# Patient Record
Sex: MALE
[De-identification: names, ages, dates, MRNs, and addresses within clinical notes are randomized per-mention and may not be internally consistent; named-entity substitution may affect disease eponyms.]

---

## 2021-12-11 ENCOUNTER — TRANSCRIPTION ENCOUNTER (OUTPATIENT)
Age: 51
End: 2021-12-11

## 2021-12-14 ENCOUNTER — APPOINTMENT (OUTPATIENT)
Dept: ORTHOPEDIC SURGERY | Facility: CLINIC | Age: 51
End: 2021-12-14
Payer: COMMERCIAL

## 2021-12-14 ENCOUNTER — NON-APPOINTMENT (OUTPATIENT)
Age: 51
End: 2021-12-14

## 2021-12-14 DIAGNOSIS — M70.22 OLECRANON BURSITIS, LEFT ELBOW: ICD-10-CM

## 2021-12-14 PROBLEM — Z00.00 ENCOUNTER FOR PREVENTIVE HEALTH EXAMINATION: Status: ACTIVE | Noted: 2021-12-14

## 2021-12-14 PROCEDURE — 99204 OFFICE O/P NEW MOD 45 MIN: CPT | Mod: 25

## 2021-12-14 PROCEDURE — 20605 DRAIN/INJ JOINT/BURSA W/O US: CPT | Mod: LT

## 2021-12-14 NOTE — PHYSICAL EXAM
[de-identified] : Left elbow: \par Skin intact, moderate swelling with mild erythema localized to the posterior aspect of the elbow consistent with olecranon bursitis. \par no sign of infection. \par ROM of the elbow intact. nontender medial and lateral epicondyles, nontender radial head, nontender olecranon. \par no instability. \par sensation intact distally, cap refill < 2 sec  [de-identified] : no xray imaging.

## 2021-12-14 NOTE — ASSESSMENT
[FreeTextEntry1] : 51 year old male with left elbow pain and swelling for 2 months, exam findings consistent with olecranon bursitis. \par The patient underwent successful aspiration of the bursitis today, tolerating the procedure well. He will avoid direct pressure on the elbow, and continue compression for the next 24-48 hours. He will fu as needed should symptoms recur.

## 2021-12-14 NOTE — HISTORY OF PRESENT ILLNESS
[FreeTextEntry1] : 51 year male presents for evaluation of left elbow pain and swelling for about two months. He reports he may have hit his elbow into something a couple months ago, and also notes he had bug bites along the posterior aspect of the elbow two months ago that he thought became infected. since then he has had persistent swelling localized to the posterior aspect of the elbow. He reports associated discomfort with ROM, activity, and direct pressure. He was seen by his PCP recently who placed him on a course of prednisone. He denies any improvement of his symptoms following the course of medication. He presents for evaluation and options for further treatment.

## 2021-12-14 NOTE — PROCEDURE
[FreeTextEntry1] : Left elbow olecranon bursitis aspiration. \par Skin prepped with chlohexadine, and anesthesized with .5 cc of 1% lidocaine. 25 cc of serosangenous fluid aspirated from the olecranon bursa successfully. Band-Aid and compression wrap applied.

## 2022-01-18 ENCOUNTER — APPOINTMENT (OUTPATIENT)
Dept: ORTHOPEDIC SURGERY | Facility: CLINIC | Age: 52
End: 2022-01-18
Payer: COMMERCIAL

## 2022-01-18 DIAGNOSIS — M19.049 PRIMARY OSTEOARTHRITIS, UNSPECIFIED HAND: ICD-10-CM

## 2022-01-18 PROCEDURE — 99213 OFFICE O/P EST LOW 20 MIN: CPT

## 2022-01-18 PROCEDURE — 73130 X-RAY EXAM OF HAND: CPT | Mod: LT

## 2022-01-18 RX ORDER — METHYLPREDNISOLONE 4 MG/1
4 TABLET ORAL
Qty: 21 | Refills: 0 | Status: ACTIVE | COMMUNITY
Start: 2021-10-01

## 2022-01-18 RX ORDER — CEFADROXIL 500 MG/1
500 CAPSULE ORAL
Qty: 20 | Refills: 0 | Status: ACTIVE | COMMUNITY
Start: 2021-10-01

## 2022-01-18 RX ORDER — LOSARTAN POTASSIUM AND HYDROCHLOROTHIAZIDE 25; 100 MG/1; MG/1
100-25 TABLET ORAL
Qty: 30 | Refills: 0 | Status: ACTIVE | COMMUNITY
Start: 2021-09-16

## 2022-01-18 RX ORDER — PREDNISONE 20 MG/1
20 TABLET ORAL
Qty: 11 | Refills: 0 | Status: ACTIVE | COMMUNITY
Start: 2021-11-05

## 2022-01-18 RX ORDER — CEFUROXIME AXETIL 250 MG/1
250 TABLET ORAL
Qty: 20 | Refills: 0 | Status: ACTIVE | COMMUNITY
Start: 2021-11-20

## 2022-01-18 RX ORDER — MELOXICAM 15 MG/1
15 TABLET ORAL
Qty: 14 | Refills: 1 | Status: ACTIVE | COMMUNITY
Start: 2022-01-18 | End: 1900-01-01

## 2022-01-18 NOTE — HISTORY OF PRESENT ILLNESS
[FreeTextEntry1] : 51 year male presents for evaluation of left elbow pain and swelling for about two months. He reports he may have hit his elbow into something a couple months ago, and also notes he had bug bites along the posterior aspect of the elbow two months ago that he thought became infected. since then he has had persistent swelling localized to the posterior aspect of the elbow. He reports associated discomfort with ROM, activity, and direct pressure. He was seen by his PCP recently who placed him on a course of prednisone. He denies any improvement of his symptoms following the course of medication. He presents for evaluation and options for further treatment. \par \par 01/18/2022: Patient returns for evaluation of left thumb pain, present for about one month. He denies any acute injury or inciting event. He localizes the pain to the base of the thumb with radiation circumferentially. He notes his pain is dull achy, sharp with activity. He presents for eval and options for treatment

## 2022-01-18 NOTE — PHYSICAL EXAM
[de-identified] : Left hand: \par There is tenderness over the left basal joint with a positive grind test, negative shoulder deformity. There is a positive crank test. There is mild swelling appreciated over the affected CMC joint which is not present on the opposite side. There is no evidence of infection or lymphadenopathy bilaterally to the level of the elbows There is no evidence of MCP hyperextension bilaterally. There is a minimally positive Finkelstein's test on the affected side. There is minimal tenderness over the A-1 pulley of the thumb of the affected side but no other digits. There is tenderness along the FCR and diminished  strength on the affected side.\par \par The wrists have a symmetric and full range of motion bilaterally with no pain upon forced flexion, extension, pronation and supination. There is no tenderness over the scaphoid scapholunate region ligaments and no tenderness of the TFCC bilaterally. There is no tenderness of the pisotriquetral hamate hook. The second through fifth CMC his is stable and nontender bilaterally.\par There is a negative carpal tunnel compression test or Tinel's bilaterally.   [de-identified] : 3 xray view of the left hand taken today reveal no acute fx.

## 2024-10-21 ENCOUNTER — EMERGENCY (EMERGENCY)
Facility: HOSPITAL | Age: 54
LOS: 0 days | Discharge: HOME | End: 2024-10-21
Attending: EMERGENCY MEDICINE
Payer: COMMERCIAL

## 2024-10-21 VITALS
TEMPERATURE: 98 F | HEART RATE: 72 BPM | RESPIRATION RATE: 18 BRPM | DIASTOLIC BLOOD PRESSURE: 103 MMHG | SYSTOLIC BLOOD PRESSURE: 165 MMHG | OXYGEN SATURATION: 96 %
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
## 2024-10-21 DIAGNOSIS — L03.113 CELLULITIS OF RIGHT UPPER LIMB: ICD-10-CM

## 2024-10-21 DIAGNOSIS — S61.011A LACERATION WITHOUT FOREIGN BODY OF RIGHT THUMB WITHOUT DAMAGE TO NAIL, INITIAL ENCOUNTER: ICD-10-CM
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
## 2024-10-21 PROCEDURE — 99284 EMERGENCY DEPT VISIT MOD MDM: CPT

## 2024-10-21 PROCEDURE — 99283 EMERGENCY DEPT VISIT LOW MDM: CPT

## 2024-10-21 RX ORDER — CEPHALEXIN 500 MG
1 CAPSULE ORAL
Qty: 28 | Refills: 0
Start: 2024-10-21 | End: 2024-10-27

## 2024-10-21 RX ORDER — CEPHALEXIN 500 MG
500 CAPSULE ORAL ONCE
Refills: 0 | Status: COMPLETED | OUTPATIENT
Start: 2024-10-21 | End: 2024-10-21

## 2024-10-21 RX ADMIN — Medication 500 MILLIGRAM(S): at 19:27

## 2024-10-21 NOTE — ED STATDOCS - PHYSICAL EXAMINATION
Patient awake, alert, orient x 3, no acute distress  Heart sounds within normal limits, regular rate rhythm, no murmur  Lungs are clear bilaterally  Superficial laceration about 2 mm to the first wet space on the right hand. Mild surrounding redness. No discharge, no swelling. NROM of the hands and fingers.

## 2024-10-21 NOTE — ED STATDOCS - CLINICAL SUMMARY MEDICAL DECISION MAKING FREE TEXT BOX
Patient with mild infection of a superficial wound.  Will start patient on antibiotics.  Tdap is up-to-date.  Recommend close outpatient follow-up.  Strict return precautions given for any worsening.  Patient verbalized understanding agree plan.

## 2024-10-21 NOTE — ED STATDOCS - NSFOLLOWUPINSTRUCTIONS_ED_ALL_ED_FT
Cellulitis    WHAT YOU NEED TO KNOW:    Cellulitis is a skin infection caused by bacteria. Cellulitis may go away on its own or you may need treatment. Your healthcare provider may draw a [place] around the outside edges of your cellulitis. If your cellulitis spreads, your healthcare provider will see it outside of the [place]. Cellulitis          DISCHARGE INSTRUCTIONS:    Call 911 if:     You have sudden trouble breathing or chest pain.        Return to the emergency department if:     Your wound gets larger and more painful.       You feel a crackling under your skin when you touch it.      You have purple dots or bumps on your skin, or you see bleeding under your skin.      You have new swelling and pain in your legs.      The red, warm, swollen area gets larger.      You see red streaks coming from the infected area.    Contact your healthcare provider if:     You have a fever.      Your fever or pain does not go away or gets worse.      The area does not get smaller after 2 days of antibiotics.      Your skin is flaking or peeling off.      You have questions or concerns about your condition or care.    Medicines:     Antibiotics help treat the bacterial infection.       NSAIDs, such as ibuprofen, help decrease swelling, pain, and fever. NSAIDs can cause stomach bleeding or kidney problems in certain people. If you take blood thinner medicine, always ask if NSAIDs are safe for you. Always read the medicine label and follow directions. Do not give these medicines to children under 6 months of age without direction from your child's healthcare provider.      Acetaminophen decreases pain and fever. It is available without a doctor's order. Ask how much to take and how often to take it. Follow directions. Read the labels of all other medicines you are using to see if they also contain acetaminophen, or ask your doctor or pharmacist. Acetaminophen can cause liver damage if not taken correctly. Do not use more than 4 grams (4,000 milligrams) total of acetaminophen in one day.       Take your medicine as directed. Contact your healthcare provider if you think your medicine is not helping or if you have side effects. Tell him or her if you are allergic to any medicine. Keep a list of the medicines, vitamins, and herbs you take. Include the amounts, and when and why you take them. Bring the list or the pill bottles to follow-up visits. Carry your medicine list with you in case of an emergency.    Self-care:     Elevate the area above the level of your heart as often as you can. This will help decrease swelling and pain. Prop the area on pillows or blankets to keep it elevated comfortably.       Clean the area daily until the wound scabs over. Gently wash the area with soap and water. Pat dry. Use dressings as directed.       Place cool or warm, wet cloths on the area as directed. Use clean cloths and clean water. Leave it on the area until the cloth is room temperature. Pat the area dry with a clean, dry cloth. The cloths may help decrease pain.     Prevent cellulitis:     Do not scratch bug bites or areas of injury. You increase your risk for cellulitis by scratching these areas.       Do not share personal items, such as towels, clothing, and razors.       Clean exercise equipment with germ-killing  before and after you use it.      Wash your hands often. Use soap and water. Wash your hands after you use the bathroom, change a child's diapers, or sneeze. Wash your hands before you prepare or eat food. Use lotion to prevent dry, cracked skin. Handwashing           Wear pressure stockings as directed. You may be told to wear the stockings if you have peripheral edema. The stockings improve blood flow and decrease swelling.      Treat athlete’s foot. This can help prevent the spread of a bacterial skin infection.    Follow up with your healthcare provider within 3 days, or as directed: Your healthcare provider will check if your cellulitis is getting better. You may need different medicine. Write down your questions so you remember to ask them during your visits.

## 2024-10-21 NOTE — ED STATDOCS - OBJECTIVE STATEMENT
55 y/o male with no pertinent PMHx presents to the ED with chief complaint of laceration to palmar base of right thumb sustained 2 days ago against cutting edge of a large lid while at work as a . He cleaned it with an antiseptic and neosporin at that time, but today noticed redness to the area. Presented to his PCP today who administered tetanus shot and advised ED evaluation. ROSANGELA.  Pharmacy: [address]

## 2024-10-21 NOTE — ED ADULT TRIAGE NOTE - CHIEF COMPLAINT QUOTE
pt presents to the ED for right hand palm laceration from Saturday states he cut hand on stainless steel lid. now hand seems red and swollen. denies fevers. pt received a tetanus shot from his MD after incident. NKDA. no other complaints at this time

## 2024-10-21 NOTE — ED STATDOCS - PROGRESS NOTE DETAILS
patient seen with ED attending at intake.  +cellulitis to hand from laceration from 2 days ago.  Will cover with keflex.  return precautions reviewed -ALEKS MixC

## 2024-10-21 NOTE — ED STATDOCS - PATIENT PORTAL LINK FT
You can access the FollowMyHealth Patient Portal offered by [facility] by registering at the following website: [email or web address]. By joining [employer]’s FollowMyHealth portal, you will also be able to view your health information using other applications (apps) compatible with our system.